# Patient Record
(demographics unavailable — no encounter records)

---

## 2024-10-31 NOTE — WORK
[Total (100%)] : total (100%) [Does not reveal pre-existing condition(s) that may affect treatment/prognosis] : does not reveal pre-existing condition(s) that may affect treatment/prognosis

## 2024-10-31 NOTE — HISTORY OF PRESENT ILLNESS
[Lower back] : lower back [Work related] : work related [8] : 8 [Radiating] : radiating [Constant] : constant [Leisure] : leisure [Sleep] : sleep [Ice] : ice [Retired] : Work status: retired [FreeTextEntry1] : 10/31/24: Was supposed to have LESI and got approval but having trouble with his anxiety and depression. Seeing an addiction specialist bc was having withdrawal systems from coming off suboxone and Xanax. Saw Dr. Kendall Long who recommended STEPHANIE vs MBBs/RFA or intracept.   Initial HPI 09/19/2024: WC 12/6/21  - injured lifting heavy buckets of water  Pain is on the LEFT side of the lower back and radiates into the left buttock described as a sharp pain worse with bending or lifting.   MRI Lumbar Spine 7/30/24 independently reviewed: L4-5 left HNP with mild stenosis and b/l facet arthropathy  Conservative Care: tried chiropractor and PT with no relief  Pain Medications: Advil PRN; was previously opioid dependent and was on suboxone and now off suboxone  Past Injections: ESIs in the past last one was 6 months.  Spine surgery: none  Blood thinners: none  [] : no [FreeTextEntry3] : 12/06/2021 [FreeTextEntry6] : pinched nerve  [FreeTextEntry7] : b/l legs [de-identified] : bending, lifting  [de-identified] : L MRI AT [facility]

## 2024-10-31 NOTE — DISCUSSION/SUMMARY
[de-identified] : After discussing various treatment options with the patient including but not limited to oral medications, physical therapy, exercise modalities as well as interventional spinal injections, we have decided with the following plan:  - Continue Home exercises, stretching, activity modification, physical therapy, and conservative care. - MRI report and/or images was reviewed and discussed with the patient. - Recommend L4-5 Lumbar Epidural Steroid Injection under fluoroscopic guidance with image. (LEFT)  - The risks, benefits and alternatives of the proposed procedure were explained in detail with the patient. The risks outlined include but are not limited to infection, bleeding, post-dural puncture headache, nerve injury, a temporary increase in pain, failure to resolve symptoms, allergic reaction, symptom recurrence, and possible elevation of blood sugar in diabetics. All questions were answered to patient's apparent satisfaction and he/she verbalized an understanding. - Patient is presenting with acute/sub-acute radicular pain with impairment in ADLs and functionality.  The pain has not responded to conservative care including NSAID therapy and/or physical therapy.  There is no bleeding tendency, unstable medical condition, or systemic infection. - Follow up in 1-2 weeks post injection for re-evaluation.

## 2024-10-31 NOTE — PHYSICAL EXAM
[de-identified] : Constitutional; Appears well, no apparent distress Ability to communicate: Normal  Respiratory: non-labored breathing Skin: No rash noted Head: Normocephalic, atraumatic Neck: no visible thyroid enlargement Eyes: Extraocular movements intact Neurologic: Alert and oriented x3 Psychiatric: normal mood, affect and behavior [] : non-antalgic

## 2025-01-23 NOTE — WORK
Message left on Whitley's voicemail to return call.     [Total (100%)] : total (100%) [Does not reveal pre-existing condition(s) that may affect treatment/prognosis] : does not reveal pre-existing condition(s) that may affect treatment/prognosis

## 2025-01-23 NOTE — HISTORY OF PRESENT ILLNESS
[Lower back] : lower back [Work related] : work related [8] : 8 [Radiating] : radiating [Constant] : constant [Leisure] : leisure [Sleep] : sleep [Ice] : ice [Retired] : Work status: retired [FreeTextEntry1] : 1/23/25: w/c follow up - retired and on disability; pt would like to hold off on the epidural and see if he can get triggers points in the meantime. Pain is on the left lower back described a tight achy pain worse with bending forward.   10/31/24: Was supposed to have LESI and got approval but having trouble with his anxiety and depression. Seeing an addiction specialist bc was having withdrawal systems from coming off suboxone and Xanax. Saw Dr. Kendall Long who recommended STEPHANIE vs MBBs/RFA or intracept.   Initial HPI 09/19/2024: WC 12/6/21  - injured lifting heavy buckets of water  Pain is on the LEFT side of the lower back and radiates into the left buttock described as a sharp pain worse with bending or lifting.   MRI Lumbar Spine 7/30/24 independently reviewed: L4-5 left HNP with mild stenosis and b/l facet arthropathy  Conservative Care: tried chiropractor and PT with no relief  Pain Medications: Advil PRN; was previously opioid dependent and was on suboxone and now off suboxone  Past Injections: ESIs in the past last one was 6 months.  Spine surgery: none  Blood thinners: none  [] : no [FreeTextEntry3] : 12/06/2021 [FreeTextEntry6] : pinched nerve  [FreeTextEntry7] : mostly Lt leg to mid-thigh  [de-identified] : bending, lifting  [de-identified] : L MRI AT [facility]

## 2025-01-23 NOTE — DISCUSSION/SUMMARY
[de-identified] : After discussing various treatment options with the patient including but not limited to oral medications, physical therapy, exercise modalities as well as interventional spinal injections, we have decided with the following plan:  - Continue home exercises, stretching, activity modification, physical therapy, and conservative care. - Follow-up as needed. - Tolerated TPI today

## 2025-01-23 NOTE — PHYSICAL EXAM
[de-identified] : Constitutional; Appears well, no apparent distress Ability to communicate: Normal  Respiratory: non-labored breathing Skin: No rash noted Head: Normocephalic, atraumatic Neck: no visible thyroid enlargement Eyes: Extraocular movements intact Neurologic: Alert and oriented x3 Psychiatric: normal mood, affect and behavior [] : non-antalgic

## 2025-03-06 NOTE — HISTORY OF PRESENT ILLNESS
[Lower back] : lower back [Work related] : work related [8] : 8 [Radiating] : radiating [Constant] : constant [Leisure] : leisure [Sleep] : sleep [Ice] : ice [Retired] : Work status: retired [FreeTextEntry1] : 03/06/2025: Had TPI at last visit with little relief. Pain still on the left lower back and radiates into the left buttock.   1/23/25: w/c follow up - retired and on disability; pt would like to hold off on the epidural and see if he can get triggers points in the meantime. Pain is on the left lower back described a tight achy pain worse with bending forward.   10/31/24: Was supposed to have LESI and got approval but having trouble with his anxiety and depression. Seeing an addiction specialist bc was having withdrawal systems from coming off suboxone and Xanax. Saw Dr. Kendall Long who recommended STEPHANIE vs MBBs/RFA or intracept.   Initial HPI 09/19/2024: WC 12/6/21  - injured lifting heavy buckets of water  Pain is on the LEFT side of the lower back and radiates into the left buttock described as a sharp pain worse with bending or lifting.   MRI Lumbar Spine 7/30/24 independently reviewed: L4-5 left HNP with mild stenosis and b/l facet arthropathy  Conservative Care: tried chiropractor and PT with no relief  Pain Medications: Advil PRN; was previously opioid dependent and was on suboxone and now off suboxone  Past Injections: ESIs in the past last one was 6 months.  Spine surgery: none  Blood thinners: none  [] : no [FreeTextEntry3] : 12/06/2021 [FreeTextEntry6] : pinched nerve  [FreeTextEntry7] : mostly Lt leg to mid-thigh  [de-identified] : bending, lifting  [de-identified] : L MRI AT [facility]

## 2025-03-06 NOTE — HISTORY OF PRESENT ILLNESS
[Lower back] : lower back [Work related] : work related [8] : 8 [Radiating] : radiating [Constant] : constant [Leisure] : leisure [Sleep] : sleep [Ice] : ice [Retired] : Work status: retired [FreeTextEntry1] : 03/06/2025: Had TPI at last visit with little relief. Pain still on the left lower back and radiates into the left buttock.   1/23/25: w/c follow up - retired and on disability; pt would like to hold off on the epidural and see if he can get triggers points in the meantime. Pain is on the left lower back described a tight achy pain worse with bending forward.   10/31/24: Was supposed to have LESI and got approval but having trouble with his anxiety and depression. Seeing an addiction specialist bc was having withdrawal systems from coming off suboxone and Xanax. Saw Dr. Kendall Long who recommended STEPHANIE vs MBBs/RFA or intracept.   Initial HPI 09/19/2024: WC 12/6/21  - injured lifting heavy buckets of water  Pain is on the LEFT side of the lower back and radiates into the left buttock described as a sharp pain worse with bending or lifting.   MRI Lumbar Spine 7/30/24 independently reviewed: L4-5 left HNP with mild stenosis and b/l facet arthropathy  Conservative Care: tried chiropractor and PT with no relief  Pain Medications: Advil PRN; was previously opioid dependent and was on suboxone and now off suboxone  Past Injections: ESIs in the past last one was 6 months.  Spine surgery: none  Blood thinners: none  [] : no [FreeTextEntry3] : 12/06/2021 [FreeTextEntry6] : pinched nerve  [FreeTextEntry7] : mostly Lt leg to mid-thigh  [de-identified] : bending, lifting  [de-identified] : L MRI AT [facility]

## 2025-03-06 NOTE — DISCUSSION/SUMMARY
[de-identified] : After discussing various treatment options with the patient including but not limited to oral medications, physical therapy, exercise modalities as well as interventional spinal injections, we have decided with the following plan:   - Continue Home exercises, stretching, activity modification, physical therapy, and conservative care. - MRI report and/or images was reviewed and discussed with the patient. - Recommend L4-5 Lumbar Epidural Steroid Injection under fluoroscopic guidance with image. (LEFT)  - The risks, benefits and alternatives of the proposed procedure were explained in detail with the patient. The risks outlined include but are not limited to infection, bleeding, post-dural puncture headache, nerve injury, a temporary increase in pain, failure to resolve symptoms, allergic reaction, symptom recurrence, and possible elevation of blood sugar in diabetics. All questions were answered to patient's apparent satisfaction and he/she verbalized an understanding. - Patient is presenting with acute/sub-acute radicular pain with impairment in ADLs and functionality.  The pain has not responded to conservative care including NSAID therapy and/or physical therapy.  There is no bleeding tendency, unstable medical condition, or systemic infection. - Follow up in 1-2 weeks post injection for re-evaluation.

## 2025-03-06 NOTE — PHYSICAL EXAM
[de-identified] : Constitutional; Appears well, no apparent distress Ability to communicate: Normal  Respiratory: non-labored breathing Skin: No rash noted Head: Normocephalic, atraumatic Neck: no visible thyroid enlargement Eyes: Extraocular movements intact Neurologic: Alert and oriented x3 Psychiatric: normal mood, affect and behavior [] : non-antalgic

## 2025-03-06 NOTE — DISCUSSION/SUMMARY
[de-identified] : After discussing various treatment options with the patient including but not limited to oral medications, physical therapy, exercise modalities as well as interventional spinal injections, we have decided with the following plan:   - Continue Home exercises, stretching, activity modification, physical therapy, and conservative care. - MRI report and/or images was reviewed and discussed with the patient. - Recommend L4-5 Lumbar Epidural Steroid Injection under fluoroscopic guidance with image. (LEFT)  - The risks, benefits and alternatives of the proposed procedure were explained in detail with the patient. The risks outlined include but are not limited to infection, bleeding, post-dural puncture headache, nerve injury, a temporary increase in pain, failure to resolve symptoms, allergic reaction, symptom recurrence, and possible elevation of blood sugar in diabetics. All questions were answered to patient's apparent satisfaction and he/she verbalized an understanding. - Patient is presenting with acute/sub-acute radicular pain with impairment in ADLs and functionality.  The pain has not responded to conservative care including NSAID therapy and/or physical therapy.  There is no bleeding tendency, unstable medical condition, or systemic infection. - Follow up in 1-2 weeks post injection for re-evaluation.

## 2025-03-06 NOTE — PHYSICAL EXAM
[de-identified] : Constitutional; Appears well, no apparent distress Ability to communicate: Normal  Respiratory: non-labored breathing Skin: No rash noted Head: Normocephalic, atraumatic Neck: no visible thyroid enlargement Eyes: Extraocular movements intact Neurologic: Alert and oriented x3 Psychiatric: normal mood, affect and behavior [] : non-antalgic

## 2025-04-17 NOTE — PROCEDURE
[FreeTextEntry3] : Date of Service: 04/17/2025   Account: [id number]  Patient: JACQUES MILLER   YOB: 1965  Age: 59 year   Surgeon:                                                         Abdoulaye Long D.O.  Pre-Operative Diagnosis:                             Lumbosacral radiculitis  Post-Operative Diagnosis:                           Same  Procedure:                                                      Interlaminar lumbar epidural steroid injection (L4-5) under fluoroscopic guidance  Anesthesia:                                                     Local with MAC   This procedure was carried out using fluoroscopic guidance.  The risks and benefits of the procedure were discussed extensively with the patient.  The consent of the patient was obtained and the following procedure was performed.  The patient was placed in the prone position.  The lumbar area was prepped and draped in a sterile fashion.  A timeout was performed with all essential staff present and the site and side were verified. Under AP view with slight cephalad-caudad angulation, the L4-5 interspace was identified and marked.  Using sterile technique, the superficial skin was anesthetized with 1% Lidocaine without epinephrine.  A 20-gauge Tuohy needle was advanced into the epidural space under fluoroscopy using [id number] technique and using loss of resistance at the L4-5 level.  After negative aspiration for heme or CSF, an epidurogram was obtained using 2-3 cc Omnipaque contrast injected under live fluoroscopy, confirming epidural placement of the needle.    Epidurogram showed no evidence of intrathecal or intravascular flow, and good evidence of bilateral epidural flow from L3-S2 levels.  After this, 4 cc of preservative free normal saline plus 12 mg of betamethasone were injected into the epidural space.  The needle was subsequently removed.  Anesthesia personnel were present throughout the procedure.  The patient tolerated the procedure well and was instructed to contact me immediately if there were any problems.  Abdoulaye Long D.O.

## 2025-05-29 NOTE — DISCUSSION/SUMMARY
[de-identified] : After discussing various treatment options with the patient including but not limited to oral medications, physical therapy, exercise modalities as well as interventional spinal injections, we have decided with the following plan:   - Continue Home exercises, stretching, activity modification, physical therapy, and conservative care. - MRI report and/or images was reviewed and discussed with the patient. - Recommend L4-5 Lumbar Epidural Steroid Injection under fluoroscopic guidance with image. (LEFT)  - The risks, benefits and alternatives of the proposed procedure were explained in detail with the patient. The risks outlined include but are not limited to infection, bleeding, post-dural puncture headache, nerve injury, a temporary increase in pain, failure to resolve symptoms, allergic reaction, symptom recurrence, and possible elevation of blood sugar in diabetics. All questions were answered to patient's apparent satisfaction and he/she verbalized an understanding. - Patient is presenting with acute/sub-acute radicular pain with impairment in ADLs and functionality.  The pain has not responded to conservative care including NSAID therapy and/or physical therapy.  There is no bleeding tendency, unstable medical condition, or systemic infection. - Follow up in 1-2 weeks post injection for re-evaluation.

## 2025-05-29 NOTE — HISTORY OF PRESENT ILLNESS
[Lower back] : lower back [Work related] : work related [8] : 8 [Radiating] : radiating [Constant] : constant [Leisure] : leisure [Sleep] : sleep [Ice] : ice [Retired] : Work status: retired [FreeTextEntry1] : 05/29/2025: s/p L4-5 LESI on 04/17/25 with 50% relief and improvement of ADL. Still having pain with bending and twisting.    03/06/2025: Had TPI at last visit with little relief. Pain still on the left lower back and radiates into the left buttock.   1/23/25: w/c follow up - retired and on disability; pt would like to hold off on the epidural and see if he can get triggers points in the meantime. Pain is on the left lower back described a tight achy pain worse with bending forward.   10/31/24: Was supposed to have LESI and got approval but having trouble with his anxiety and depression. Seeing an addiction specialist bc was having withdrawal systems from coming off suboxone and Xanax. Saw Dr. Kendall Long who recommended STEPHANIE vs MBBs/RFA or intracept.   Initial HPI 09/19/2024: WC 12/6/21  - injured lifting heavy buckets of water  Pain is on the LEFT side of the lower back and radiates into the left buttock described as a sharp pain worse with bending or lifting.   MRI Lumbar Spine 7/30/24 independently reviewed: L4-5 left HNP with mild stenosis and b/l facet arthropathy  Conservative Care: tried chiropractor and PT with no relief  Pain Medications: Advil PRN; was previously opioid dependent and was on suboxone and now off suboxone  Past Injections: ESIs in the past last one was 6 months.  Spine surgery: none  Blood thinners: none  [] : This patient has had an injection before: no [FreeTextEntry3] : 12/06/2021 [FreeTextEntry6] : pinched nerve  [FreeTextEntry7] : mostly Lt leg to mid-thigh  [de-identified] : bending, lifting  [de-identified] : L MRI AT [facility]

## 2025-05-29 NOTE — HISTORY OF PRESENT ILLNESS
[Lower back] : lower back [Work related] : work related [8] : 8 [Radiating] : radiating [Constant] : constant [Leisure] : leisure [Sleep] : sleep [Ice] : ice [Retired] : Work status: retired [FreeTextEntry1] : 05/29/2025: s/p L4-5 LESI on 04/17/25 with >50% relief and improvement of ADLs. Still has pain with lifting and activity.   03/06/2025: Had TPI at last visit with little relief. Pain still on the left lower back and radiates into the left buttock.   1/23/25: w/c follow up - retired and on disability; pt would like to hold off on the epidural and see if he can get triggers points in the meantime. Pain is on the left lower back described a tight achy pain worse with bending forward.   10/31/24: Was supposed to have LESI and got approval but having trouble with his anxiety and depression. Seeing an addiction specialist bc was having withdrawal systems from coming off suboxone and Xanax. Saw Dr. Kendall Long who recommended STEPHANIE vs MBBs/RFA or intracept.   Initial HPI 09/19/2024: WC 12/6/21  - injured lifting heavy buckets of water  Pain is on the LEFT side of the lower back and radiates into the left buttock described as a sharp pain worse with bending or lifting.   MRI Lumbar Spine 7/30/24 independently reviewed: L4-5 left HNP with mild stenosis and b/l facet arthropathy  Conservative Care: tried chiropractor and PT with no relief  Pain Medications: Advil PRN; was previously opioid dependent and was on suboxone and now off suboxone  Past Injections: ESIs in the past last one was 6 months.  Spine surgery: none  Blood thinners: none  [] : no [FreeTextEntry3] : 12/06/2021 [FreeTextEntry6] : pinched nerve  [FreeTextEntry7] : mostly Lt leg to mid-thigh  [de-identified] : bending, lifting  [de-identified] : L MRI AT [facility]

## 2025-05-29 NOTE — DISCUSSION/SUMMARY
[de-identified] : After discussing various treatment options with the patient including but not limited to oral medications, physical therapy, exercise modalities as well as interventional spinal injections, we have decided with the following plan:   - Continue Home exercises, stretching, activity modification, physical therapy, and conservative care. - MRI report and/or images was reviewed and discussed with the patient. - Recommend L4-5 Lumbar Epidural Steroid Injection under fluoroscopic guidance with image. (LEFT)  - The risks, benefits and alternatives of the proposed procedure were explained in detail with the patient. The risks outlined include but are not limited to infection, bleeding, post-dural puncture headache, nerve injury, a temporary increase in pain, failure to resolve symptoms, allergic reaction, symptom recurrence, and possible elevation of blood sugar in diabetics. All questions were answered to patient's apparent satisfaction and he/she verbalized an understanding. - Patient is presenting with acute/sub-acute radicular pain with impairment in ADLs and functionality.  The pain has not responded to conservative care including NSAID therapy and/or physical therapy.  There is no bleeding tendency, unstable medical condition, or systemic infection. - Follow up in 1-2 weeks post injection for re-evaluation.

## 2025-05-29 NOTE — DISCUSSION/SUMMARY
[de-identified] : After discussing various treatment options with the patient including but not limited to oral medications, physical therapy, exercise modalities as well as interventional spinal injections, we have decided with the following plan:   - Continue Home exercises, stretching, activity modification, physical therapy, and conservative care. - MRI report and/or images was reviewed and discussed with the patient. - Recommend L4-5 Lumbar Epidural Steroid Injection under fluoroscopic guidance with image. (LEFT)  - The risks, benefits and alternatives of the proposed procedure were explained in detail with the patient. The risks outlined include but are not limited to infection, bleeding, post-dural puncture headache, nerve injury, a temporary increase in pain, failure to resolve symptoms, allergic reaction, symptom recurrence, and possible elevation of blood sugar in diabetics. All questions were answered to patient's apparent satisfaction and he/she verbalized an understanding. - Patient is presenting with acute/sub-acute radicular pain with impairment in ADLs and functionality.  The pain has not responded to conservative care including NSAID therapy and/or physical therapy.  There is no bleeding tendency, unstable medical condition, or systemic infection. - Follow up in 1-2 weeks post injection for re-evaluation.

## 2025-05-29 NOTE — PHYSICAL EXAM
[de-identified] : Constitutional; Appears well, no apparent distress Ability to communicate: Normal  Respiratory: non-labored breathing Skin: No rash noted Head: Normocephalic, atraumatic Neck: no visible thyroid enlargement Eyes: Extraocular movements intact Neurologic: Alert and oriented x3 Psychiatric: normal mood, affect and behavior [] : non-antalgic

## 2025-05-29 NOTE — PHYSICAL EXAM
[de-identified] : Constitutional; Appears well, no apparent distress Ability to communicate: Normal  Respiratory: non-labored breathing Skin: No rash noted Head: Normocephalic, atraumatic Neck: no visible thyroid enlargement Eyes: Extraocular movements intact Neurologic: Alert and oriented x3 Psychiatric: normal mood, affect and behavior [] : non-antalgic

## 2025-05-29 NOTE — HISTORY OF PRESENT ILLNESS
[FreeTextEntry1] : 05/29/2025 : s/p L4-5 LESI on 04/17/25 with 50% relief and improvement of ADLS  03/06/2025: Had TPI at last visit with little relief. Pain still on the left lower back and radiates into the left buttock.   1/23/25: w/c follow up - retired and on disability; pt would like to hold off on the epidural and see if he can get triggers points in the meantime. Pain is on the left lower back described a tight achy pain worse with bending forward.   10/31/24: Was supposed to have LESI and got approval but having trouble with his anxiety and depression. Seeing an addiction specialist bc was having withdrawal systems from coming off suboxone and Xanax. Saw Dr. Kendall Long who recommended STEPHANIE vs MBBs/RFA or intracept.   Initial HPI 09/19/2024: WC 12/6/21  - injured lifting heavy buckets of water  Pain is on the LEFT side of the lower back and radiates into the left buttock described as a sharp pain worse with bending or lifting.   MRI Lumbar Spine 7/30/24 independently reviewed: L4-5 left HNP with mild stenosis and b/l facet arthropathy  Conservative Care: tried chiropractor and PT with no relief  Pain Medications: Advil PRN; was previously opioid dependent and was on suboxone and now off suboxone  Past Injections: ESIs in the past last one was 6 months.  Spine surgery: none  Blood thinners: none  [] : no [FreeTextEntry3] : 12/06/2021 [FreeTextEntry6] : pinched nerve  [FreeTextEntry7] : mostly Lt leg to mid-thigh  [de-identified] : bending, lifting  [de-identified] : L MRI AT [facility]

## 2025-05-29 NOTE — DISCUSSION/SUMMARY
[de-identified] : After discussing various treatment options with the patient including but not limited to oral medications, physical therapy, exercise modalities as well as interventional spinal injections, we have decided with the following plan:  - Continue home exercises, stretching, activity modification, physical therapy, and conservative care. - Follow-up as needed.

## 2025-05-29 NOTE — HISTORY OF PRESENT ILLNESS
[FreeTextEntry1] : 05/29/2025 : s/p L4-5 LESI on 04/17/25 with 50% relief and improvement of ADLS  03/06/2025: Had TPI at last visit with little relief. Pain still on the left lower back and radiates into the left buttock.   1/23/25: w/c follow up - retired and on disability; pt would like to hold off on the epidural and see if he can get triggers points in the meantime. Pain is on the left lower back described a tight achy pain worse with bending forward.   10/31/24: Was supposed to have LESI and got approval but having trouble with his anxiety and depression. Seeing an addiction specialist bc was having withdrawal systems from coming off suboxone and Xanax. Saw Dr. Kendall Long who recommended STEPHANIE vs MBBs/RFA or intracept.   Initial HPI 09/19/2024: WC 12/6/21  - injured lifting heavy buckets of water  Pain is on the LEFT side of the lower back and radiates into the left buttock described as a sharp pain worse with bending or lifting.   MRI Lumbar Spine 7/30/24 independently reviewed: L4-5 left HNP with mild stenosis and b/l facet arthropathy  Conservative Care: tried chiropractor and PT with no relief  Pain Medications: Advil PRN; was previously opioid dependent and was on suboxone and now off suboxone  Past Injections: ESIs in the past last one was 6 months.  Spine surgery: none  Blood thinners: none  [] : no [FreeTextEntry3] : 12/06/2021 [FreeTextEntry6] : pinched nerve  [FreeTextEntry7] : mostly Lt leg to mid-thigh  [de-identified] : bending, lifting  [de-identified] : L MRI AT [facility]

## 2025-05-29 NOTE — HISTORY OF PRESENT ILLNESS
[Lower back] : lower back [Work related] : work related [8] : 8 [Radiating] : radiating [Constant] : constant [Leisure] : leisure [Sleep] : sleep [Ice] : ice [Retired] : Work status: retired [FreeTextEntry1] : 05/29/2025: s/p L4-5 LESI on 04/17/25 with >50% relief and improvement of ADLs. Still has pain with lifting and activity.   03/06/2025: Had TPI at last visit with little relief. Pain still on the left lower back and radiates into the left buttock.   1/23/25: w/c follow up - retired and on disability; pt would like to hold off on the epidural and see if he can get triggers points in the meantime. Pain is on the left lower back described a tight achy pain worse with bending forward.   10/31/24: Was supposed to have LESI and got approval but having trouble with his anxiety and depression. Seeing an addiction specialist bc was having withdrawal systems from coming off suboxone and Xanax. Saw Dr. Kendall Long who recommended STEPHANIE vs MBBs/RFA or intracept.   Initial HPI 09/19/2024: WC 12/6/21  - injured lifting heavy buckets of water  Pain is on the LEFT side of the lower back and radiates into the left buttock described as a sharp pain worse with bending or lifting.   MRI Lumbar Spine 7/30/24 independently reviewed: L4-5 left HNP with mild stenosis and b/l facet arthropathy  Conservative Care: tried chiropractor and PT with no relief  Pain Medications: Advil PRN; was previously opioid dependent and was on suboxone and now off suboxone  Past Injections: ESIs in the past last one was 6 months.  Spine surgery: none  Blood thinners: none  [] : no [FreeTextEntry3] : 12/06/2021 [FreeTextEntry6] : pinched nerve  [FreeTextEntry7] : mostly Lt leg to mid-thigh  [de-identified] : bending, lifting  [de-identified] : L MRI AT [facility]

## 2025-05-29 NOTE — PHYSICAL EXAM
[de-identified] : Constitutional; Appears well, no apparent distress Ability to communicate: Normal  Respiratory: non-labored breathing Skin: No rash noted Head: Normocephalic, atraumatic Neck: no visible thyroid enlargement Eyes: Extraocular movements intact Neurologic: Alert and oriented x3 Psychiatric: normal mood, affect and behavior [] : non-antalgic

## 2025-05-29 NOTE — WORK
[Total (100%)] : total (100%) [Does not reveal pre-existing condition(s) that may affect treatment/prognosis] : does not reveal pre-existing condition(s) that may affect treatment/prognosis [Has the patient reached Maximum Medical Improvement? If yes, indicate date___] : Yes, on [unfilled] [Is there permanent partial impairment?] : Yes [FreeTextEntry6] : lumbar spine  [] : Occasionally [Sedentary Work] : sedentary work [Could this patient perform his/her at-injury work activities with restrictions? If yes, specify below.] : No [Could this patient perform any work activities with or without restrictions? Explain below.] : No [If not working, could reasonable accommodations be made to enable patient to perform work? Explain.] : No [Has the patient had an injury/illness since the date of injury which impacts residual functional capacity?] : No [de-identified] : lumbar spine  [de-identified] : S11.4 [de-identified] : F

## 2025-05-29 NOTE — DISCUSSION/SUMMARY
[de-identified] : After discussing various treatment options with the patient including but not limited to oral medications, physical therapy, exercise modalities as well as interventional spinal injections, we have decided with the following plan:   - Continue Home exercises, stretching, activity modification, physical therapy, and conservative care. - MRI report and/or images was reviewed and discussed with the patient. - Recommend L4-5 Lumbar Epidural Steroid Injection under fluoroscopic guidance with image. (LEFT)  - The risks, benefits and alternatives of the proposed procedure were explained in detail with the patient. The risks outlined include but are not limited to infection, bleeding, post-dural puncture headache, nerve injury, a temporary increase in pain, failure to resolve symptoms, allergic reaction, symptom recurrence, and possible elevation of blood sugar in diabetics. All questions were answered to patient's apparent satisfaction and he/she verbalized an understanding. - Patient is presenting with acute/sub-acute radicular pain with impairment in ADLs and functionality.  The pain has not responded to conservative care including NSAID therapy and/or physical therapy.  There is no bleeding tendency, unstable medical condition, or systemic infection. - Follow up in 1-2 weeks post injection for re-evaluation.

## 2025-05-29 NOTE — PHYSICAL EXAM
[de-identified] : Constitutional; Appears well, no apparent distress Ability to communicate: Normal  Respiratory: non-labored breathing Skin: No rash noted Head: Normocephalic, atraumatic Neck: no visible thyroid enlargement Eyes: Extraocular movements intact Neurologic: Alert and oriented x3 Psychiatric: normal mood, affect and behavior [] : non-antalgic

## 2025-05-29 NOTE — PHYSICAL EXAM
[de-identified] : Constitutional; Appears well, no apparent distress Ability to communicate: Normal  Respiratory: non-labored breathing Skin: No rash noted Head: Normocephalic, atraumatic Neck: no visible thyroid enlargement Eyes: Extraocular movements intact Neurologic: Alert and oriented x3 Psychiatric: normal mood, affect and behavior [] : non-antalgic